# Patient Record
Sex: FEMALE | Race: BLACK OR AFRICAN AMERICAN
[De-identification: names, ages, dates, MRNs, and addresses within clinical notes are randomized per-mention and may not be internally consistent; named-entity substitution may affect disease eponyms.]

---

## 2019-03-31 NOTE — ER
Nurse's Notes                                                                                     

 [facility]                                                                 

Name: Isabella Rucker                                                                                

Age: 43 yrs                                                                                       

Sex: Female                                                                                       

: 1975                                                                                   

MRN: [id number]                                                                                   

Arrival Date: 2019                                                                          

Time: 10:47                                                                                       

Account#: [id number]                                                                            

Bed 15                                                                                            

Private MD: Jarad Sevilla                                                                         

Diagnosis: Candidiasis                                                                            

                                                                                                  

Presentation:                                                                                     

                                                                                             

11:05 Presenting complaint: Patient states: has been dealing with a yeast infection X 3       iw  

      weeks, missed her doctor appt to get a refill on her prescription. Pt denies pain or        

      burning with urination. Transition of care: patient was not received from another           

      setting of care. Onset of symptoms was 2019. Risk Assessment: Do you want to      

      hurt yourself or someone else? Patient reports no desire to harm self or others.            

      Initial Sepsis Screen: Does the patient meet any 2 criteria? No. Patient's initial          

      sepsis screen is negative. Does the patient have a suspected source of infection? No.       

      Patient's initial sepsis screen is negative. Care prior to arrival: None.                   

11:05 Method Of Arrival: Ambulatory                                                           iw  

11:05 Acuity: ARIANE 5                                                                           iw  

                                                                                                  

OB/GYN:                                                                                           

11:07 LMP 2019, menopausal                                                                  iw  

                                                                                                  

Historical:                                                                                       

- Allergies:                                                                                      

11:09 PENICILLINS;                                                                            iw  

- PMHx:                                                                                           

11:09 Migraines;                                                                              iw  

- PSHx:                                                                                           

11:09 Tubal ligation; ; Hernia repair;                                               iw  

                                                                                                  

- Immunization history:: Adult Immunizations not up to date.                                      

- Social history:: Smoking status: Patient/guardian denies using tobacco.                         

- Ebola Screening: : Patient negative for fever greater than or equal to 101.5 degrees            

  Fahrenheit, and additional compatible Ebola Virus Disease symptoms Patient denies               

  exposure to infectious person Patient denies travel to an Ebola-affected area in the            

  21 days before illness onset No symptoms or risks identified at this time.                      

                                                                                                  

                                                                                                  

Screenin:10 Abuse screen: Denies threats or abuse. Denies injuries from another. Nutritional        ph  

      screening: No deficits noted. Tuberculosis screening: No symptoms or risk factors           

      identified. Fall Risk None identified.                                                      

                                                                                                  

Assessment:                                                                                       

11:10 General: Appears in no apparent distress. comfortable, well groomed, Behavior is calm,  ph  

      cooperative, appropriate for age, Denies fever, feeling ill, chills. Pain: Complains of     

      pain in pelvis Quality of pain is described as burning. Neuro: Level of Consciousness       

      is awake, alert, obeys commands, Oriented to person, place, time, situation.                

      Cardiovascular: Capillary refill < 3 seconds in bilateral fingers Patient's skin is         

      warm and dry. Respiratory: Airway is patent Respiratory effort is even, unlabored. :      

      Reports vaginal itching. Derm: Skin is healthy with good turgor, Skin is pink, warm \T\     

      dry. Musculoskeletal: Circulation, motion, and sensation intact. Range of motion:           

      intact in all extremities.                                                                  

                                                                                                  

Vital Signs:                                                                                      

11:07  / 88; Pulse 77; Resp 16; Temp 98.2; Pulse Ox 100% on R/A; Weight 92.53 kg;       iw  

      Height 5 ft. 4 in. (162.56 cm);                                                             

11:07 Body Mass Index 35.02 (92.53 kg, 162.56 cm)                                             iw  

                                                                                                  

ED Course:                                                                                        

10:47 Patient arrived in ED.                                                                  mr  

10:48 Jarad Sevilla MD is Private Physician.                                                 mr  

11:00 [facility]                                                        kb  

11:00 Rahul Davenport MD is Attending Physician.                                                kb  

11:07 Triage completed.                                                                       iw  

11:09 Arm band placed on.                                                                     iw  

11:10 Patient has correct armband on for positive identification. Bed in low position. Call   ph  

      light in reach.                                                                             

11:15 Christal Ryan, RN is Primary Nurse.                                                    ph  

11:30 No provider procedures requiring assistance completed. Patient did not have IV access   ph  

      during this emergency room visit.                                                           

                                                                                                  

Administered Medications:                                                                         

No medications were administered                                                                  

                                                                                                  

                                                                                                  

Outcome:                                                                                          

11:17 Discharge ordered by MD.                                                                kb  

11:31 Patient left the ED.                                                                    ph  

11:31 Discharged to home ambulatory.                                                          ph  

11:31 Condition: good                                                                             

11:31 Discharge instructions given to patient, Instructed on discharge instructions, follow       

      up and referral plans. medication usage, Demonstrated understanding of instructions,        

      follow-up care, medications, Prescriptions given X 1.                                       

                                                                                                  

Signatures:                                                                                       

Vickie Cat FNP-C FNP-Ckb                                                   

Karen Mancini                                 Cathy Salgado RN                     RN                                                      

Christal Ryan RN                      RN   ph                                                   

                                                                                                  

**************************************************************************************************

## 2019-03-31 NOTE — EDPHYS
Physician Documentation                                                                           

 [facility]                                                                 

Name: Isabella Rucker                                                                                

Age: 43 yrs                                                                                       

Sex: Female                                                                                       

: 1975                                                                                   

MRN: [id number]                                                                                   

Arrival Date: 2019                                                                          

Time: 10:47                                                                                       

Account#: [id number]                                                                            

Bed 15                                                                                            

Private MD: Jarad Sevilla                                                                         

ED Physician Rahul Davenport                                                                         

HPI:                                                                                              

                                                                                             

11:11 This 43 yrs old Black Female presents to ER via Ambulatory with complaints of Vaginal   kb  

      Itching.                                                                                    

11:11 The patient presents with perineal itching, of both inguinal areas. Onset: The          kb  

      symptoms/episode began/occurred yesterday. Modifying factors: The symptoms are              

      alleviated by nothing, the symptoms are aggravated by sweating, friction. Associated        

      signs and symptoms: Pertinent positives: itching, Pertinent negatives: constipation,        

      cramping, diarrhea, dyspareunia, dysuria, fever, hematuria, nausea, urinary frequency,      

      vaginal bleeding, vaginal discharge, vomiting. Severity of symptoms: At their worst the     

      symptoms were moderate, in the emergency department the symptoms are unchanged. The         

      patient has not experienced similar symptoms in the past. The patient has not recently      

      seen a physician. Pt reports she gets a yeast rash frequently to bilateral inguinal         

      areas. States she normally uses clotrimazole/betamethasone for it, but she ran out.         

      States she is about to go on vacation and needs the cream because she is having the         

      symptoms again. .                                                                           

                                                                                                  

OB/GYN:                                                                                           

11:07 LMP 2019, menopausal                                                                  iw  

                                                                                                  

Historical:                                                                                       

- Allergies:                                                                                      

11:09 PENICILLINS;                                                                            iw  

- PMHx:                                                                                           

11:09 Migraines;                                                                              iw  

- PSHx:                                                                                           

11:09 Tubal ligation; ; Hernia repair;                                               iw  

                                                                                                  

- Immunization history:: Adult Immunizations not up to date.                                      

- Social history:: Smoking status: Patient/guardian denies using tobacco.                         

- Ebola Screening: : Patient negative for fever greater than or equal to 101.5 degrees            

  Fahrenheit, and additional compatible Ebola Virus Disease symptoms Patient denies               

  exposure to infectious person Patient denies travel to an Ebola-affected area in the            

  21 days before illness onset No symptoms or risks identified at this time.                      

                                                                                                  

                                                                                                  

ROS:                                                                                              

11:16 Constitutional: Negative for fever, chills, and weight loss, Neck: Negative for injury, kb  

      pain, and swelling, Cardiovascular: Negative for chest pain, palpitations, and edema,       

      Respiratory: Negative for shortness of breath, cough, wheezing, and pleuritic chest         

      pain, Abdomen/GI: Negative for abdominal pain, nausea, vomiting, diarrhea, and              

      constipation, MS/Extremity: Negative for injury and deformity, Neuro: Negative for          

      headache, weakness, numbness, tingling, and seizure.                                        

11:16 Skin: Positive for rash, of the right femoral area and left femoral area, itching, raw.     

                                                                                                  

Exam:                                                                                             

11:11 Constitutional:  This is a well developed, well nourished patient who is awake, alert,  kb  

      and in no acute distress. Head/Face:  Normocephalic, atraumatic. Chest/axilla:  Normal      

      chest wall appearance and motion.  Nontender with no deformity.  No lesions are             

      appreciated. Cardiovascular:  Regular rate and rhythm with a normal S1 and S2.  No          

      gallops, murmurs, or rubs.  Normal PMI, no JVD.  No pulse deficits. Respiratory:  Lungs     

      have equal breath sounds bilaterally, clear to auscultation and percussion.  No rales,      

      rhonchi or wheezes noted.  No increased work of breathing, no retractions or nasal          

      flaring. Abdomen/GI:  Soft, non-tender, with normal bowel sounds.  No distension or         

      tympany.  No guarding or rebound.  No evidence of tenderness throughout. Skin:  Warm,       

      dry with normal turgor.  Normal color with no rashes, no lesions, and no evidence of        

      cellulitis. MS/ Extremity:  Pulses equal, no cyanosis.  Neurovascular intact.  Full,        

      normal range of motion. Neuro:  Awake and alert, GCS 15, oriented to person, place,         

      time, and situation.  Cranial nerves II-XII grossly intact.  Motor strength 5/5 in all      

      extremities.  Sensory grossly intact.  Cerebellar exam normal.  Normal gait.                

                                                                                                  

Vital Signs:                                                                                      

11:07  / 88; Pulse 77; Resp 16; Temp 98.2; Pulse Ox 100% on R/A; Weight 92.53 kg;       iw  

      Height 5 ft. 4 in. (162.56 cm);                                                             

11:07 Body Mass Index 35.02 (92.53 kg, 162.56 cm)                                             iw  

                                                                                                  

MDM:                                                                                              

11:01 Patient medically screened.                                                             kb  

11:16 Data reviewed: vital signs, nurses notes. Data interpreted: Pulse oximetry: on room air kb  

      is 100 %. Interpretation: normal. Counseling: I had a detailed discussion with the          

      patient and/or guardian regarding: the historical points, exam findings, and any            

      diagnostic results supporting the discharge/admit diagnosis, the need for outpatient        

      follow up, a family practitioner, to return to the emergency department if symptoms         

      worsen or persist or if there are any questions or concerns that arise at home.             

                                                                                                  

Administered Medications:                                                                         

No medications were administered                                                                  

                                                                                                  

                                                                                                  

Disposition:                                                                                      

11:34 Co-signature as Attending Physician, Rahul Davenport MD.                                    rn  

                                                                                                  

Disposition:                                                                                      

19 11:17 Discharged to Home. Impression: Candidiasis.                                       

- Condition is Stable.                                                                            

- Discharge Instructions: Skin Yeast Infection.                                                   

- Prescriptions for clotrimazole- betamethasone 1-0.05 % Topical lotion - apply 1                 

  application by TOPICAL route 2 times per day As needed; 1 tube.                                 

- Medication Reconciliation Form, Thank You Letter, Antibiotic Education, Prescription            

  Opioid Use form.                                                                                

- Follow up: Emergency Department; When: As needed; Reason: Worsening of condition.               

  Follow up: Private Physician; When: 2 - 3 days; Reason: Recheck today's complaints,             

  Continuance of care, Re-evaluation by your physician.                                           

                                                                                                  

                                                                                                  

                                                                                                  

Signatures:                                                                                       

Vickie Cat, BEVERLY-C                 FNP-Cathy Tan, Rahul Paul RN, MD MD rn Hall, Patricia, RN                      RN   ph                                                   

                                                                                                  

Corrections: (The following items were deleted from the chart)                                    

11: 11:17 2019 11:17 Discharged to Home. Impression: Candidiasis. Condition is        ph  

      Stable. Discharge Instructions: Skin Yeast Infection. Prescriptions for                     

      clotrimazole-betamethasone 1-0.05 % Topical lotion - apply 1 application by TOPICAL         

      route 2 times per day As needed; 1 tube. and Forms are Medication Reconciliation Form,      

      Thank You Letter, Antibiotic Education, Prescription Opioid Use. Follow up: Emergency       

      Department; When: As needed; Reason: Worsening of condition. Follow up: Private             

      Physician; When: 2 - 3 days; Reason: Recheck today's complaints, Continuance of care,       

      Re-evaluation by your physician. kb                                                         

                                                                                                  

**************************************************************************************************

## 2020-02-16 ENCOUNTER — HOSPITAL ENCOUNTER (EMERGENCY)
Dept: HOSPITAL 97 - ER | Age: 45
Discharge: HOME | End: 2020-02-16
Payer: COMMERCIAL

## 2020-02-16 VITALS — TEMPERATURE: 98.1 F | OXYGEN SATURATION: 100 %

## 2020-02-16 VITALS — SYSTOLIC BLOOD PRESSURE: 109 MMHG | DIASTOLIC BLOOD PRESSURE: 68 MMHG

## 2020-02-16 DIAGNOSIS — T15.12XA: Primary | ICD-10-CM

## 2020-02-16 DIAGNOSIS — Z88.0: ICD-10-CM

## 2020-02-16 PROCEDURE — 99283 EMERGENCY DEPT VISIT LOW MDM: CPT

## 2020-02-16 NOTE — ER
Nurse's Notes                                                                                     

 [facility]                                                                 

Name: Isabella Rucker                                                                                

Age: 44 yrs                                                                                       

Sex: Female                                                                                       

: 1975                                                                                   

MRN: [id number]                                                                                   

Arrival Date: 2020                                                                          

Time: 21:41                                                                                       

Account#: [id number]                                                                            

Bed 15                                                                                            

Private MD: Jarad Sevilla                                                                         

Diagnosis: Foreign body in conjunctival sac, left eye                                             

                                                                                                  

Presentation:                                                                                     

                                                                                             

21:53 Presenting complaint: Patient states: she was cleaning house yesterday dusting the      bb  

      ceiling and got something in her left eye which is very painful and is making her eye       

      red. Transition of care: patient was not received from another setting of care. Onset       

      of symptoms was February 15, 2020. Risk Assessment: Do you want to hurt yourself or         

      someone else? Patient reports no desire to harm self or others. Initial Sepsis Screen:      

      Does the patient meet any 2 criteria? No. Patient's initial sepsis screen is negative.      

      Does the patient have a suspected source of infection? No. Patient's initial sepsis         

      screen is negative. Care prior to arrival: None.                                            

21:53 Method Of Arrival: Ambulatory                                                           bb  

21:53 Acuity: ARIANE 4                                                                           bb  

                                                                                                  

Triage Assessment:                                                                                

22:37 General: Behavior is calm, cooperative.                                                 rv  

                                                                                                  

OB/GYN:                                                                                           

21:56 LMP N/A - Irregular menses                                                              bb  

                                                                                                  

Historical:                                                                                       

- Allergies:                                                                                      

21:56 PENICILLINS;                                                                            bb  

- Home Meds:                                                                                      

21:56 None [Active];                                                                          bb  

- PMHx:                                                                                           

21:56 Migraines;                                                                              bb  

- PSHx:                                                                                           

21:56 Cholecystectomy;                                                                        bb  

                                                                                                  

- Immunization history:: Adult Immunizations up to date, Flu vaccine is not up to date.           

- Coronavirus screen:: The patient has NOT traveled to [place] in the past 14 days.                 

  Proceed with normal triage process as indicated.                                                

- Social history:: Smoking status: Patient/guardian denies using tobacco, the patient             

  reports quitting approximately 8 years ago.                                                     

- Ebola Screening: : No symptoms or risks identified at this time.                                

                                                                                                  

                                                                                                  

Screenin:35 Abuse screen: Denies threats or abuse. Denies injuries from another. Nutritional        rv  

      screening: No deficits noted. Tuberculosis screening: No symptoms or risk factors           

      identified. Fall Risk None identified.                                                      

                                                                                                  

Assessment:                                                                                       

22:04 General: Appears in no apparent distress. Pain: Complains of pain in left eye.          rv  

22:04 Neuro: Level of Consciousness is awake, alert, obeys commands, Oriented to person,      rv  

      place, time, situation.                                                                     

22:04 EENT: Eyes redness on the left eye.                                                     rv  

22:34 Reassessment: Dr Apple done the eye exam the bedside. explained the plan of care.     rv  

      discharged ambulatory. Patient states feeling better.                                       

                                                                                                  

Vital Signs:                                                                                      

21:56  / 81; Pulse 81; Resp 16 S; Temp 98.1(O); Pulse Ox 100% on R/A; Weight 90.72 kg   bb  

      (R); Height 5 ft. 4 in. (162.56 cm) (R); Pain 9/10;                                         

22:35  / 68; Pulse 70; Resp 16; Pulse Ox 100% on R/A;                                   rv  

21:56 Body Mass Index 34.33 (90.72 kg, 162.56 cm)                                             bb  

                                                                                                  

Visual Acuity:                                                                                    

21:57 Left Eye Visual acuity 20/15, ; Right Eye Visual acuity 20/15, ; Both Eyes Visual       bb  

      acuity 20/15; Without Lenses;                                                               

                                                                                                  

ED Course:                                                                                        

21:41 Patient arrived in ED.                                                                  es  

21:41 Jarad Sevilla MD is Private Physician.                                                 es  

21:45 Fili Apple MD is Attending Physician.                                           ma2 

21:51 Pedro Pablo Buckner RN is Primary Nurse.                                                  rv  

21:55 Triage completed.                                                                       bb  

21:56 Arm band placed on Patient placed in an exam room, on a stretcher, on pulse oximetry.   bb  

22:28 Jarad Lincoln MD is Referral Physician.                                                 ma2 

22:28 Mitchel Lincoln MD is Referral Physician.                                              ma2 

22:28 Referral Physician role handed off by Jarad Lincoln MD                                  ma2 

22:36 No provider procedures requiring assistance completed. Patient did not have IV access   rv  

      during this emergency room visit.                                                           

22:37 Patient has correct armband on for positive identification. Pulse ox on. NIBP on.       rv  

                                                                                                  

Administered Medications:                                                                         

22:15 Drug: Tetracaine Drops 0.5 % 1 drops {Note: Dr Apple used it on the left eye.} Route: rv  

      Ophthalmic; Site: left eye;                                                                 

22:38 Follow up: Response: No adverse reaction                                                rv  

                                                                                                  

                                                                                                  

Outcome:                                                                                          

22:30 Discharge ordered by MD.                                                                ma2 

22:36 Discharged to home ambulatory.                                                          rv  

22:36 Condition: good                                                                             

22:36 Discharge instructions given to patient, Instructed on discharge instructions, follow       

      up and referral plans. medication usage, Demonstrated understanding of instructions,        

      follow-up care, medications, Prescriptions given X 1.                                       

22:39 Patient left the ED.                                                                    rv  

                                                                                                  

Signatures:                                                                                       

Letty Mckinley Brenda RN                     RN   bb                                                   

Fili Apple MD MD   ma2                                                  

Pedro Pablo Buckner RN                    RN   rv                                                   

                                                                                                  

************************************************************************************************** Subjective:       Patient ID: Neo Logan is a 69 y.o. male.    Chief Complaint: Cough and URI  He was las seen in primary care on 02/21/2017 by Dr. Thomas. This is his first time seeing me in the clinic. He was hospitalized on 02/22/2017. He is accompanied by his spouse.  Cough   This is a recurrent problem. Cough characteristics: phlegm. Pertinent negatives include no shortness of breath. Treatments tried: albuterol, tessalon. The treatment provided mild relief.   URI    Associated symptoms include coughing.   He is here requesting refills on azithromycin and cefuroxime. He completed them both within the last few weeks. He was discharged from the hospital on both  He does have a history of asthma and a recent hospitalization with URI  Vitals:    03/17/17 1511   BP: 132/68   Pulse: 82   Resp: 18   Temp: 98.6 °F (37 °C)   SPO2@ 97%  Review of Systems   Respiratory: Positive for cough. Negative for shortness of breath.      He did have asthma as a child and into college where he last experienced major problems  Objective:      Physical Exam   Constitutional: He is oriented to person, place, and time. Vital signs are normal. He appears well-developed and well-nourished.   HENT:   Head: Normocephalic and atraumatic.   Right Ear: Hearing and external ear normal.   Left Ear: Hearing and external ear normal.   Nose: Nose normal.   Neck: Normal range of motion. Neck supple.   Cardiovascular: Normal rate and regular rhythm.    Pulmonary/Chest: Effort normal.   Minimal mild end inspiratory wheeze to right anterior and posterior    Abdominal: Soft. There is no tenderness.   Musculoskeletal: Normal range of motion.   Lymphadenopathy:        Head (right side): No submental, no submandibular, no tonsillar, no preauricular, no posterior auricular and no occipital adenopathy present.        Head (left side): No submental, no submandibular, no tonsillar, no preauricular, no posterior auricular and no occipital adenopathy  present.   Neurological: He is alert and oriented to person, place, and time.   Skin: Skin is warm, dry and intact.   Psychiatric: He has a normal mood and affect. His speech is normal and behavior is normal. Judgment and thought content normal. Cognition and memory are normal.   Nursing note and vitals reviewed.      Assessment & Plan:       Cough  -     benzonatate (TESSALON) 100 MG capsule; 1 - 2 po every 6 hours prn cough  Dispense: 45 capsule; Refill: 0  -     albuterol 90 mcg/actuation inhaler; Inhale 2 puffs into the lungs every 6 (six) hours as needed for Wheezing. Rescue  -     NEBULIZER KIT (SUPPLIES) FOR HOME USE    Upper respiratory tract infection, unspecified type-table  -     benzonatate (TESSALON) 100 MG capsule; 1 - 2 po every 6 hours prn cough  Dispense: 45 capsule; Refill: 0    Moderate persistent asthma with acute exacerbation  -     albuterol 90 mcg/actuation inhaler; Inhale 2 puffs into the lungs every 6 (six) hours as needed for Wheezing. Rescue  -     NEBULIZER KIT (SUPPLIES) FOR HOME USE    SOBOE (shortness of breath on exertion)  -     albuterol 90 mcg/actuation inhaler; Inhale 2 puffs into the lungs every 6 (six) hours as needed for Wheezing. Rescue  -     NEBULIZER KIT (SUPPLIES) FOR HOME USE    Other orders  -     albuterol (PROVENTIL) 2.5 mg /3 mL (0.083 %) nebulizer solution; Take 3 mLs (2.5 mg total) by nebulization every 6 (six) hours as needed for Wheezing or Shortness of Breath (cough). Rescue  Dispense: 25 each; Refill: 3          Return in about 1 week (around 3/24/2017), or if symptoms worsen or fail to improve.

## 2020-02-16 NOTE — EDPHYS
Physician Documentation                                                                           

 [facility]                                                                 

Name: Isabella Rucker                                                                                

Age: 44 yrs                                                                                       

Sex: Female                                                                                       

: 1975                                                                                   

MRN: [id number]                                                                                   

Arrival Date: 2020                                                                          

Time: 21:41                                                                                       

Account#: [id number]                                                                            

Bed 15                                                                                            

Private MD: Jarad Sevilla                                                                         

ED Physician Fili Apple                                                                    

HPI:                                                                                              

                                                                                             

22:20 This 44 yrs old Black Female presents to ER via Ambulatory with complaints of Foreign   ma2 

      Body In Eye.                                                                                

22:20 Onset: The symptoms/episode began/occurred suddenly, 2 day(s) ago. Associated signs and ma2 

      symptoms: Pertinent negatives: chills, dizziness, fever, runny nose. Patient wears          

      glasses. Severity of symptoms: At their worst the symptoms were very mild in the            

      emergency department the symptoms are unchanged. The patient has not experienced            

      similar symptoms in the past. was dusting and had fo.                                       

                                                                                                  

OB/GYN:                                                                                           

21:56 LMP N/A - Irregular menses                                                              bb  

                                                                                                  

Historical:                                                                                       

- Allergies:                                                                                      

21:56 PENICILLINS;                                                                            bb  

- Home Meds:                                                                                      

21:56 None [Active];                                                                          bb  

- PMHx:                                                                                           

21:56 Migraines;                                                                              bb  

- PSHx:                                                                                           

21:56 Cholecystectomy;                                                                        bb  

                                                                                                  

- Immunization history:: Adult Immunizations up to date, Flu vaccine is not up to date.           

- Coronavirus screen:: The patient has NOT traveled to [place] in the past 14 days.                 

  Proceed with normal triage process as indicated.                                                

- Social history:: Smoking status: Patient/guardian denies using tobacco, the patient             

  reports quitting approximately 8 years ago.                                                     

- Ebola Screening: : No symptoms or risks identified at this time.                                

                                                                                                  

                                                                                                  

ROS:                                                                                              

22:25 Constitutional: Negative for fever, chills, and weight loss.                            ma2 

22:25 All other systems are negative.                                                             

                                                                                                  

Exam:                                                                                             

22:25 Visual Acuity: Visual acuity is within normal limits.                                   ma2 

22:25 Constitutional:  This is a well developed, well nourished patient who is awake, alert,      

      and in no acute distress. Head/Face:  Normocephalic, atraumatic. Eyes:  Pupils equal        

      round and reactive to light, extra-ocular motions intact.  Lids and lashes normal.          

      Conjunctiva and sclera are non-icteric and not injected.  Cornea within normal limits.      

      Periorbital areas with no swelling, redness, or edema.  wood lamp exam done with            

      tetracaine and floric. exam, dust peice seen embedded n cornea, no glob rupture,            

      attempted to romove with qtips, after flush with ns, failed attempts  ENT:  Nares           

      patent. No nasal discharge, no septal abnormalities noted.  Tympanic membranes are          

      normal and external auditory canals are clear.  Oropharynx with no redness, swelling,       

      or masses, exudates, or evidence of obstruction, uvula midline.  Mucous membranes           

      moist.                                                                                      

                                                                                                  

Vital Signs:                                                                                      

21:56  / 81; Pulse 81; Resp 16 S; Temp 98.1(O); Pulse Ox 100% on R/A; Weight 90.72 kg   bb  

      (R); Height 5 ft. 4 in. (162.56 cm) (R); Pain 9/10;                                         

22:35  / 68; Pulse 70; Resp 16; Pulse Ox 100% on R/A;                                   rv  

21:56 Body Mass Index 34.33 (90.72 kg, 162.56 cm)                                             bb  

                                                                                                  

Visual Acuity:                                                                                    

21:57 Left Eye Visual acuity 20/15, ; Right Eye Visual acuity 20/15, ; Both Eyes Visual       bb  

      acuity 20/15; Without Lenses;                                                               

                                                                                                  

MDM:                                                                                              

21:45 Patient medically screened.                                                             ma2 

22:25 Differential diagnosis: Corneal abrasion of Data reviewed: vital signs, nurses notes.   ma2 

      Counseling: I had a detailed discussion with the patient and/or guardian regarding: the     

      historical points, exam findings, and any diagnostic results supporting the                 

      discharge/admit diagnosis, the presence of at least one elevated blood pressure reading     

      (>120/80) during this emergency department visit, the need for outpatient follow up.        

      Response to treatment: There is no appreciated change of the patient's symptoms at this     

      time.                                                                                       

                                                                                                  

Administered Medications:                                                                         

22:15 Drug: Tetracaine Drops 0.5 % 1 drops {Note: Dr Apple used it on the left eye.} Route: rv  

      Ophthalmic; Site: left eye;                                                                 

22:38 Follow up: Response: No adverse reaction                                                rv  

                                                                                                  

                                                                                                  

Disposition:                                                                                      

20 22:30 Discharged to Home. Impression: Foreign body in conjunctival sac, left eye.        

- Condition is Stable.                                                                            

- Discharge Instructions: Corneal Abrasion, Easy-to-Read.                                         

- Prescriptions for Gentamicin 0.3 % Ophthalmic Drops - instill 1 drop by OPHTHALMIC              

  route every 4 hours for 7 days; 1 bottle.                                                       

- Medication Reconciliation Form, Thank You Letter, Antibiotic Education, Prescription            

  Opioid Use form.                                                                                

- Follow up: Jarad Lincoln MD; When: Tomorrow; Reason: Continuance of care. Follow up:            

  Mitchel Lincoln MD; When: Tomorrow; Reason: Continuance of care.                               

                                                                                                  

                                                                                                  

                                                                                                  

Signatures:                                                                                       

Coleen Pitt RN                     RN   Fili De La Rosa MD MD   ma2                                                  

Pedro Pablo Buckner RN RN   rv                                                   

                                                                                                  

Corrections: (The following items were deleted from the chart)                                    

22:39 22:30 2020 22:30 Discharged to Home. Impression: Foreign body in conjunctival     rv  

      sac, left eye. Condition is Stable. Forms are Medication Reconciliation Form, Thank You     

      Letter, Antibiotic Education, Prescription Opioid Use. Follow up: Mitchel Lincoln; When:     

      Tomorrow; Reason: Continuance of care. ma2                                                  

                                                                                                  

**************************************************************************************************

## 2022-05-31 ENCOUNTER — HOSPITAL ENCOUNTER (EMERGENCY)
Dept: HOSPITAL 97 - ER | Age: 47
LOS: 1 days | Discharge: HOME | End: 2022-06-01
Payer: COMMERCIAL

## 2022-05-31 DIAGNOSIS — Z88.0: ICD-10-CM

## 2022-05-31 DIAGNOSIS — S20.219A: ICD-10-CM

## 2022-05-31 DIAGNOSIS — M54.2: ICD-10-CM

## 2022-05-31 DIAGNOSIS — S39.012A: Primary | ICD-10-CM
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
## 2022-05-31 PROCEDURE — 72125 CT NECK SPINE W/O DYE: CPT

## 2022-05-31 PROCEDURE — 70450 CT HEAD/BRAIN W/O DYE: CPT

## 2022-05-31 PROCEDURE — 85610 PROTHROMBIN TIME: CPT

## 2022-05-31 PROCEDURE — 80076 HEPATIC FUNCTION PANEL: CPT

## 2022-05-31 PROCEDURE — 86901 BLOOD TYPING SEROLOGIC RH(D): CPT

## 2022-05-31 PROCEDURE — 96375 TX/PRO/DX INJ NEW DRUG ADDON: CPT

## 2022-05-31 PROCEDURE — 74177 CT ABD & PELVIS W/CONTRAST: CPT

## 2022-05-31 PROCEDURE — 96361 HYDRATE IV INFUSION ADD-ON: CPT

## 2022-05-31 PROCEDURE — 80048 BASIC METABOLIC PNL TOTAL CA: CPT

## 2022-05-31 PROCEDURE — 93005 ELECTROCARDIOGRAM TRACING: CPT

## 2022-05-31 PROCEDURE — 85025 COMPLETE CBC W/AUTO DIFF WBC: CPT

## 2022-05-31 PROCEDURE — 85730 THROMBOPLASTIN TIME PARTIAL: CPT

## 2022-05-31 PROCEDURE — 99284 EMERGENCY DEPT VISIT MOD MDM: CPT

## 2022-05-31 PROCEDURE — 71260 CT THORAX DX C+: CPT

## 2022-05-31 PROCEDURE — 96374 THER/PROPH/DIAG INJ IV PUSH: CPT

## 2022-05-31 PROCEDURE — 86900 BLOOD TYPING SEROLOGIC ABO: CPT

## 2022-05-31 PROCEDURE — 36415 COLL VENOUS BLD VENIPUNCTURE: CPT

## 2022-05-31 PROCEDURE — 86850 RBC ANTIBODY SCREEN: CPT

## 2022-06-01 VITALS — SYSTOLIC BLOOD PRESSURE: 135 MMHG | DIASTOLIC BLOOD PRESSURE: 95 MMHG

## 2022-06-01 VITALS — TEMPERATURE: 97.9 F | OXYGEN SATURATION: 100 %

## 2022-06-01 LAB
ALBUMIN SERPL BCP-MCNC: 3.4 G/DL (ref 3.4–5)
ALP SERPL-CCNC: 86 U/L (ref 45–117)
ALT SERPL W P-5'-P-CCNC: 17 U/L (ref 12–78)
AST SERPL W P-5'-P-CCNC: 8 U/L (ref 15–37)
BUN BLD-MCNC: 18 MG/DL (ref 7–18)
GLUCOSE SERPLBLD-MCNC: 97 MG/DL (ref 74–106)
HCT VFR BLD CALC: 36 % (ref 36–45)
INR BLD: 1.03
LYMPHOCYTES # SPEC AUTO: 3.4 K/UL (ref 0.7–4.9)
PMV BLD: 7.4 FL (ref 7.6–11.3)
POTASSIUM SERPL-SCNC: 3.7 MMOL/L (ref 3.5–5.1)
RBC # BLD: 4.09 M/UL (ref 3.86–4.86)

## 2022-06-01 NOTE — EKG
Test Date:    2022-05-31               Test Time:    22:45:56

Technician:   HEIDE                                     

                                                     

MEASUREMENT RESULTS:                                       

Intervals:                                           

Rate:         58                                     

IN:           150                                    

QRSD:         70                                     

QT:           408                                    

QTc:          400                                    

Axis:                                                

P:            74                                     

IN:           150                                    

QRS:          38                                     

T:            45                                     

                                                     

INTERPRETIVE STATEMENTS:                                       

                                                     

Sinus bradycardia

Otherwise normal ECG

No previous ECG available for comparison



Electronically Signed On 06-01-22 12:59:42 CDT by Perfecto Vance

## 2022-06-01 NOTE — RAD REPORT
EXAM DESCRIPTION:  CT Head and Cervical Spine Without Intravenous Contrast

 

CLINICAL HISTORY:  Trauma

 

TECHNIQUE:  Axial computed tomography images of the head/brain and cervical spine without intravenous
 contrast.   Sagittal and coronal reformatted images were created and reviewed.   This CT exam was pe
rformed using one or more of the following dose reduction techniques:   automated exposure control, a
djustment of the mA and/or kV according to patient size, and/or use of iterative reconstruction techn
ique.

 

COMPARISON:  No relevant prior studies available.

 

FINDINGS:  Brain:   Unremarkable.   No hemorrhage.   No significant white matter disease.   No edema.


Ventricles:   Unremarkable.   No ventriculomegaly.

Skull:   No acute fracture.

Sinuses:   Unremarkable as visualized.   No acute sinusitis.

Mastoid air cells:   Unremarkable as visualized.   No mastoid effusion.

Vertebrae:   Unremarkable.   No acute fracture.   Normal alignment.

Discs/spinal canal/neural foramina:   Mild to moderate multilevel disc degeneration with small to mod
erate concentric disc osteophytes. No critical canal stenosis.

Soft tissues:   Unremarkable.

* A single impression for all exams can be found at the end of this report

_______________________________________________

 

EXAM DESCRIPTION:  CT Chest, Abdomen and Pelvis With Intravenous Contrast

 

CLINICAL HISTORY:  Trauma

 

TECHNIQUE:  Axial computed tomography images of the chest, abdomen and pelvis with intravenous contra
st.   Sagittal and coronal reformatted images were created and reviewed.   This CT exam was performed
 using one or more of the following dose reduction techniques:   automated exposure control, adjustme
nt of the mA and/or kV according to patient size, and/or use of iterative reconstruction technique.

 

COMPARISON:  No relevant prior studies available.

 

FINDINGS:  CHEST:

Lungs:   Minimal bibasilar subsegmental atelectasis/pleural parenchymal scar.

Pleural space:   Unremarkable.   No significant effusion.   No pneumothorax.

Heart:   Unremarkable.   No cardiomegaly.   No significant pericardial effusion.   No significant cor
onary artery calcifications.

ABDOMEN:

Liver:   Unremarkable.   No mass.

Gallbladder and bile ducts:   Prior cholecystectomy. Mild biliary dilatation.

Pancreas:   Unremarkable.   No ductal dilation.   No mass.

Spleen:   Unremarkable.   No splenomegaly.

Adrenals:   Unremarkable.   No mass.

Kidneys and ureters:   Unremarkable.   No hydronephrosis.   No solid mass.

Stomach and bowel:   Duodenal diverticulum.   Moderate stool. Colonic diverticula without adjacent in
flammatory change.   No obstruction.   No mucosal thickening.

PELVIS:

Appendix:   Normal caliber appendix.   No findings to suggest acute appendicitis.

Bladder:   Unremarkable.   No mass.

Reproductive:   Lobulated contour at the uterine fundus suggestive of an underlying fibroid. Bilatera
l tubal ligation clips. No adnexal mass.

CHEST, ABDOMEN and PELVIS:

Intraperitoneal space:   Unremarkable.   No significant fluid collection.   No free air.

Bones/joints:   Multilevel spondylosis. No acute fracture.   No dislocation.

Soft tissues:   Small fat-containing umbilical hernia.

Vasculature:   Unremarkable.   No aortic aneurysm.

Lymph nodes:   Unremarkable.   No enlarged lymph nodes.

* A single impression for all exams can be found at the end of this report

_______________________________________________

 

IMPRESSION:  CT Head and Cervical Spine Without Intravenous Contrast:

1.   No acute intracranial or extra-axial abnormality.

2.   No acute cervical spine injury.

CT Chest, Abdomen and Pelvis With Intravenous Contrast:

1.   No acute intrathoracic injury.

2.   No evidence for hollow or solid organ injury.

3.   Other findings as above.

 

Electronically signed by:   Chuyita Mclean MD   6/1/2022 2:41 AM CDT Workstation: [id number]

 

 

 

Due to temporary technical issues with the PACS/Fluency reporting system, reports are being signed by
 the in house radiologists without review as a courtesy to insure prompt reporting. The interpreting 
radiologist is fully responsible for the content of the report.
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
## 2023-03-17 ENCOUNTER — HOSPITAL ENCOUNTER (EMERGENCY)
Dept: HOSPITAL 97 - ER | Age: 48
Discharge: HOME | End: 2023-03-17
Payer: SELF-PAY

## 2023-03-17 VITALS — OXYGEN SATURATION: 100 % | DIASTOLIC BLOOD PRESSURE: 98 MMHG | TEMPERATURE: 97.1 F | SYSTOLIC BLOOD PRESSURE: 129 MMHG

## 2023-03-17 DIAGNOSIS — L03.011: Primary | ICD-10-CM

## 2023-03-17 PROCEDURE — 99282 EMERGENCY DEPT VISIT SF MDM: CPT

## 2023-03-17 NOTE — ER
Nurse's Notes                                                                                     

 [facility]                                                                 

Name: Isabella Rucker                                                                                

Age: 47 yrs                                                                                       

Sex: Female                                                                                       

: 1975                                                                                   

MRN: [id number]                                                                                   

Arrival Date: 2023                                                                          

Time: 07:35                                                                                       

Account#: [id number]                                                                            

Bed 6                                                                                             

Private MD: Jarad Sevilla                                                                         

Diagnosis: Subungual abscess                                                                      

                                                                                                  

Presentation:                                                                                     

                                                                                             

07:39 Chief complaint: Artificial nail got caught when attempting to open tote bag last week, hb  

      had the artificial nail removed but is having increasing pain in right ring finger over     

      last 2 days. Coronavirus screen: At this time, the client does not indicate any             

      symptoms associated with coronavirus-19. Ebola Screen: No symptoms or risks identified      

      at this time. Initial Sepsis Screen: Does the patient meet any 2 criteria? No.              

      Patient's initial sepsis screen is negative. Does the patient have a suspected source       

      of infection? No. Patient's initial sepsis screen is negative. Risk Assessment: Do you      

      want to hurt yourself or someone else? Patient reports no desire to harm self or            

      others. Onset of symptoms was March 10, 2023.                                               

07:39 Method Of Arrival: Ambulatory                                                           hb  

07:39 Acuity: ARIANE 4                                                                           hb  

                                                                                                  

Triage Assessment:                                                                                

07:41 General: Appears in no apparent distress. Behavior is calm, cooperative. Pain: Pain     hb  

      currently is 10 out of 10 on a pain scale. EENT:. Neuro: Level of Consciousness is          

      awake, alert, obeys commands, Oriented to person, place, time, situation.                   

      Cardiovascular: Patient's skin is warm and dry. Respiratory: Respiratory effort is          

      even, unlabored, Respiratory pattern is regular, symmetrical. Musculoskeletal: right        

      ring fingernail discolored green, mild swelling noted to fingertip.                         

                                                                                                  

Historical:                                                                                       

- Allergies:                                                                                      

07:41 PENICILLINS;                                                                            hb  

- PMHx:                                                                                           

07:41 Migraines;                                                                              hb  

                                                                                                  

- Immunization history:: Adult Immunizations up to date.                                          

- Social history:: Smoking status: Patient denies any tobacco usage or history of.                

                                                                                                  

                                                                                                  

Screenin:43 [facility] ED Fall Risk Assessment (Adult) Score/Fall Risk Level 0 - 2 = Low Risk         hb  

      Oriented to surroundings, Maintained a safe environment. Abuse screen: Denies threats       

      or abuse. Denies injuries from another. Nutritional screening: No deficits noted.           

      Tuberculosis screening: No symptoms or risk factors identified.                             

                                                                                                  

Assessment:                                                                                       

07:43 General: See triage assessment.                                                         hb  

                                                                                                  

Vital Signs:                                                                                      

07:39  / 98; Pulse 87; Resp 16; Temp 97.1; Pulse Ox 100% on R/A; Weight 95.25 kg;       hb  

      Height 5 ft. 4 in. ; Pain 10/10;                                                            

07:39 Body Mass Index 36.05 (95.25 kg, 162.56 cm)                                             hb  

07:39 Pain Scale: Adult                                                                       hb  

                                                                                                  

ED Course:                                                                                        

07:35 Patient arrived in ED.                                                                  mr  

07:35 Jarad Sevilla MD is Private Physician.                                                 mr  

07:41 Triage completed.                                                                       hb  

07:41 Arm band placed on.                                                                     hb  

07:43 Patient has correct armband on for positive identification.                             hb  

07:43 No provider procedures requiring assistance completed. Patient did not have IV access   hb  

      during this emergency room visit.                                                           

07:58 David Carter, RN is Primary Nurse.                                                    bp  

08:00 Vickie Cat FNP-C is PHCP.                                                        kb  

08:00 Ari Rios MD is Attending Physician.                                            kb  

                                                                                                  

Administered Medications:                                                                         

No medications were administered                                                                  

                                                                                                  

                                                                                                  

Medication:                                                                                       

07:43 VIS not applicable for this client.                                                     hb  

                                                                                                  

Outcome:                                                                                          

08:26 Discharge ordered by MD.                                                                kb  

                                                                                                  

Signatures:                                                                                       

Vickie Cat FNP-C FNP-Kaylynn Santanaa Karen ParksRomina, RN                     RN   hb                                                   

David Carter, RN                      RN   bp                                                   

                                                                                                  

**************************************************************************************************

## 2023-03-17 NOTE — EDPHYS
Physician Documentation                                                                           

 [facility]                                                                 

Name: Isabella Rucker                                                                                

Age: 47 yrs                                                                                       

Sex: Female                                                                                       

: 1975                                                                                   

MRN: [id number]                                                                                   

Arrival Date: 2023                                                                          

Time: 07:35                                                                                       

Account#: [id number]                                                                            

Bed 6                                                                                             

Private MD: Jarad Sevilla                                                                         

ED Physician Ari Rios                                                                     

HPI:                                                                                              

                                                                                             

08:18 This 47 yrs old Black Female presents to ER via Ambulatory with complaints of Infected  kb  

      finger.                                                                                     

08:19 The patient presents with an abscess of the right ring fingernail. Description:         kb  

      swollen. Onset: The symptoms/episode began/occurred yesterday. Possible cause(s):           

      unknown. Associated signs and symptoms: Pertinent positives: swelling. Modifying            

      factors: the symptoms are alleviated by nothing, the symptoms are aggravated by             

      pressure. Severity of symptoms: At their worst the symptoms were mild, in the emergency     

      department the symptoms are unchanged. The patient has not experienced similar symptoms     

      in the past. The patient has not recently seen a physician. Patient reports she hit her     

      right ring finger last week and yesterday noticed discoloration under nail and swelling     

      to fingertip..                                                                              

                                                                                                  

Historical:                                                                                       

- Allergies:                                                                                      

07:41 PENICILLINS;                                                                            hb  

- PMHx:                                                                                           

07:41 Migraines;                                                                              hb  

                                                                                                  

- Immunization history:: Adult Immunizations up to date.                                          

- Social history:: Smoking status: Patient denies any tobacco usage or history of.                

                                                                                                  

                                                                                                  

ROS:                                                                                              

08:15 Constitutional: Negative for fever, chills, and weight loss.                            kb  

08:15 MS/extremity: Positive for pain, swelling, tenderness, of the right ring fingernail.        

08:15 All other systems are negative.                                                             

                                                                                                  

Exam:                                                                                             

08:16 Constitutional:  This is a well developed, well nourished patient who is awake, alert,  kb  

      and in no acute distress. Head/Face:  Normocephalic, atraumatic. ENT:  Moist Mucous         

      membranes Cardiovascular:  Regular rate and rhythm with a normal S1 and S2.  No             

      gallops, murmurs, or rubs.  No pulse deficits. Respiratory:  Respirations even and          

      unlabored. No increased work of breathing. Talking in full sentences Abdomen/GI:  Soft,     

      non-tender. No distention Skin:  Warm, dry with normal turgor.  Normal color. Neuro:        

      Awake and alert, GCS 15, oriented to person, place, time, and situation. Moves all          

      extremities. Normal gait.                                                                   

08:16 Musculoskeletal/extremity: Nails: subungual abscess right ring finger.                      

                                                                                                  

Vital Signs:                                                                                      

07:39  / 98; Pulse 87; Resp 16; Temp 97.1; Pulse Ox 100% on R/A; Weight 95.25 kg;       hb  

      Height 5 ft. 4 in. ; Pain 10/10;                                                            

07:39 Body Mass Index 36.05 (95.25 kg, 162.56 cm)                                             hb  

07:39 Pain Scale: Adult                                                                       hb  

                                                                                                  

MDM:                                                                                              

08:00 Patient medically screened.                                                             kb  

08:15 Data reviewed: vital signs, nurses notes.                                               kb  

08:24 Differential diagnosis: abscess, Paronychia, fungal infection. Counseling: I had a      kb  

      detailed discussion with the patient and/or guardian regarding: the historical points,      

      exam findings, and any diagnostic results supporting the discharge/admit diagnosis, the     

      need for outpatient follow up, a hand specialist, to return to the emergency department     

      if symptoms worsen or persist or if there are any questions or concerns that arise at       

      home. ED course: Patient is a 47-year-old female with a history of migraines who            

      presents for swelling to tip of right ring finger and discoloration under nail that         

      started yesterday. On exam patient has a subungual abscess. Nontoxic in appearance.         

      Full range of motion. 18-gauge needle used to make a hole in the top of the nail,           

      purulent drainage expressed. Patient educated on hot water soaks and need for               

      antibiotics. Educated to follow-up with hand and/or dermatology for persistent              

      symptoms. Verbal understanding received..                                                   

                                                                                                  

Administered Medications:                                                                         

No medications were administered                                                                  

                                                                                                  

                                                                                                  

Disposition Summary:                                                                              

23 08:26                                                                                    

Discharge Ordered                                                                                 

      Location: Home                                                                            

      Condition: Stable                                                                         

      Diagnosis                                                                                   

        - Subungual abscess                                                                     

      Followup:                                                                               kb  

        - With: Emergency Department                                                               

        - When: As needed                                                                          

        - Reason: Worsening of condition                                                           

      Followup:                                                                               kb  

        - With: Private Physician                                                                  

        - When: 2 - 3 days                                                                         

        - Reason: Recheck today's complaints, Continuance of care, Re-evaluation by your           

      physician                                                                                   

      Forms:                                                                                      

        - Medication Reconciliation Form                                                      kb  

        - Thank You Letter                                                                    kb  

        - Antibiotic Education                                                                kb  

        - Prescription Opioid Use                                                             kb  

Signatures:                                                                                       

Vickie Cat FNP-C FNP-[facility]                                                   

Romina Parks, RN                     RN   hb                                                   

                                                                                                  

**************************************************************************************************
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
## 2025-05-07 ENCOUNTER — HOSPITAL ENCOUNTER (EMERGENCY)
Dept: HOSPITAL 97 - ER | Age: 50
Discharge: HOME | End: 2025-05-07
Payer: SELF-PAY

## 2025-05-07 VITALS — OXYGEN SATURATION: 100 % | TEMPERATURE: 98.4 F

## 2025-05-07 VITALS — DIASTOLIC BLOOD PRESSURE: 91 MMHG | SYSTOLIC BLOOD PRESSURE: 128 MMHG

## 2025-05-07 DIAGNOSIS — M25.512: Primary | ICD-10-CM

## 2025-05-07 PROCEDURE — 93971 EXTREMITY STUDY: CPT

## 2025-05-07 PROCEDURE — 99284 EMERGENCY DEPT VISIT MOD MDM: CPT
